# Patient Record
Sex: MALE | Race: WHITE | Employment: FULL TIME | URBAN - METROPOLITAN AREA
[De-identification: names, ages, dates, MRNs, and addresses within clinical notes are randomized per-mention and may not be internally consistent; named-entity substitution may affect disease eponyms.]

---

## 2017-03-30 ENCOUNTER — ANESTHESIA EVENT (OUTPATIENT)
Dept: SURGERY | Age: 57
End: 2017-03-30
Payer: COMMERCIAL

## 2017-03-31 ENCOUNTER — APPOINTMENT (OUTPATIENT)
Dept: CARDIAC CATH/INVASIVE PROCEDURES | Age: 57
End: 2017-03-31
Payer: COMMERCIAL

## 2017-03-31 ENCOUNTER — ANESTHESIA (OUTPATIENT)
Dept: SURGERY | Age: 57
End: 2017-03-31
Payer: COMMERCIAL

## 2017-03-31 ENCOUNTER — SURGERY (OUTPATIENT)
Age: 57
End: 2017-03-31

## 2017-03-31 PROCEDURE — 74011250636 HC RX REV CODE- 250/636

## 2017-03-31 PROCEDURE — 77030020143 HC AIRWY LARYN INTUB CGAS -A: Performed by: ANESTHESIOLOGY

## 2017-03-31 PROCEDURE — 74011000250 HC RX REV CODE- 250

## 2017-03-31 RX ORDER — DOBUTAMINE HYDROCHLORIDE 200 MG/100ML
INJECTION INTRAVENOUS
Status: DISCONTINUED | OUTPATIENT
Start: 2017-03-31 | End: 2017-03-31 | Stop reason: HOSPADM

## 2017-03-31 RX ORDER — LIDOCAINE HYDROCHLORIDE 20 MG/ML
INJECTION, SOLUTION EPIDURAL; INFILTRATION; INTRACAUDAL; PERINEURAL AS NEEDED
Status: DISCONTINUED | OUTPATIENT
Start: 2017-03-31 | End: 2017-03-31 | Stop reason: HOSPADM

## 2017-03-31 RX ORDER — PROPOFOL 10 MG/ML
INJECTION, EMULSION INTRAVENOUS
Status: DISCONTINUED | OUTPATIENT
Start: 2017-03-31 | End: 2017-03-31 | Stop reason: HOSPADM

## 2017-03-31 RX ORDER — FENTANYL CITRATE 50 UG/ML
INJECTION, SOLUTION INTRAMUSCULAR; INTRAVENOUS AS NEEDED
Status: DISCONTINUED | OUTPATIENT
Start: 2017-03-31 | End: 2017-03-31 | Stop reason: HOSPADM

## 2017-03-31 RX ORDER — CEFAZOLIN SODIUM 1 G/3ML
INJECTION, POWDER, FOR SOLUTION INTRAMUSCULAR; INTRAVENOUS AS NEEDED
Status: DISCONTINUED | OUTPATIENT
Start: 2017-03-31 | End: 2017-03-31 | Stop reason: HOSPADM

## 2017-03-31 RX ORDER — PROPOFOL 10 MG/ML
INJECTION, EMULSION INTRAVENOUS AS NEEDED
Status: DISCONTINUED | OUTPATIENT
Start: 2017-03-31 | End: 2017-03-31 | Stop reason: HOSPADM

## 2017-03-31 RX ORDER — SODIUM CHLORIDE, SODIUM LACTATE, POTASSIUM CHLORIDE, CALCIUM CHLORIDE 600; 310; 30; 20 MG/100ML; MG/100ML; MG/100ML; MG/100ML
INJECTION, SOLUTION INTRAVENOUS
Status: DISCONTINUED | OUTPATIENT
Start: 2017-03-31 | End: 2017-03-31 | Stop reason: HOSPADM

## 2017-03-31 RX ORDER — ONDANSETRON 2 MG/ML
INJECTION INTRAMUSCULAR; INTRAVENOUS AS NEEDED
Status: DISCONTINUED | OUTPATIENT
Start: 2017-03-31 | End: 2017-03-31 | Stop reason: HOSPADM

## 2017-03-31 RX ADMIN — FENTANYL CITRATE 50 MCG: 50 INJECTION, SOLUTION INTRAMUSCULAR; INTRAVENOUS at 09:56

## 2017-03-31 RX ADMIN — FENTANYL CITRATE 50 MCG: 50 INJECTION, SOLUTION INTRAMUSCULAR; INTRAVENOUS at 09:50

## 2017-03-31 RX ADMIN — PROPOFOL 60 MG: 10 INJECTION, EMULSION INTRAVENOUS at 08:23

## 2017-03-31 RX ADMIN — CEFAZOLIN SODIUM 2 G: 1 INJECTION, POWDER, FOR SOLUTION INTRAMUSCULAR; INTRAVENOUS at 08:29

## 2017-03-31 RX ADMIN — PROPOFOL 120 MCG/KG/MIN: 10 INJECTION, EMULSION INTRAVENOUS at 08:24

## 2017-03-31 RX ADMIN — PROPOFOL 100 MG: 10 INJECTION, EMULSION INTRAVENOUS at 09:57

## 2017-03-31 RX ADMIN — LIDOCAINE HYDROCHLORIDE 30 MG: 20 INJECTION, SOLUTION EPIDURAL; INFILTRATION; INTRACAUDAL; PERINEURAL at 08:23

## 2017-03-31 RX ADMIN — ONDANSETRON 4 MG: 2 INJECTION INTRAMUSCULAR; INTRAVENOUS at 10:44

## 2017-03-31 RX ADMIN — DOBUTAMINE HYDROCHLORIDE 5 MCG/KG/MIN: 200 INJECTION INTRAVENOUS at 10:33

## 2017-03-31 RX ADMIN — SODIUM CHLORIDE, SODIUM LACTATE, POTASSIUM CHLORIDE, CALCIUM CHLORIDE: 600; 310; 30; 20 INJECTION, SOLUTION INTRAVENOUS at 08:13

## 2017-03-31 NOTE — ANESTHESIA PREPROCEDURE EVALUATION
Anesthetic History   No history of anesthetic complications            Review of Systems / Medical History  Patient summary reviewed and pertinent labs reviewed    Pulmonary  Within defined limits                 Neuro/Psych   Within defined limits           Cardiovascular    Hypertension        Dysrhythmias (on Flecanide) : PVC      Exercise tolerance: >4 METS  Comments: Last echo 2015 showed normal LVEF with mild to moderate MR   GI/Hepatic/Renal  Within defined limits             Comments: Celiac Dz Endo/Other  Within defined limits           Other Findings              Physical Exam    Airway  Mallampati: II  TM Distance: > 6 cm  Neck ROM: normal range of motion   Mouth opening: Normal     Cardiovascular  Regular rate and rhythm,  S1 and S2 normal,  no murmur, click, rub, or gallop  Rhythm: regular  Rate: normal         Dental    Dentition: Upper partial plate     Pulmonary  Breath sounds clear to auscultation               Abdominal  GI exam deferred       Other Findings            Anesthetic Plan    ASA: 2  Anesthesia type: total IV anesthesia          Induction: Intravenous  Anesthetic plan and risks discussed with: Patient

## 2017-03-31 NOTE — ANESTHESIA POSTPROCEDURE EVALUATION
Post-Anesthesia Evaluation and Assessment    Patient: Patricio Bal MRN: [id number]  SSN: [id number]    YOB: 1960  Age: 55 y.o. Sex: male       Cardiovascular Function/Vital Signs  Visit Vitals    /90    Pulse 64    Temp 36.3 °C (97.3 °F)    Resp 12    Ht 5' 9\" (1.753 m)    Wt 77.1 kg (170 lb)    SpO2 97%    BMI 25.1 kg/m2       Patient is status post total IV anesthesia anesthesia for Procedure(s):  PVC  ABLATION. Nausea/Vomiting: None    Postoperative hydration reviewed and adequate. Pain:  Pain Scale 1: Numeric (0 - 10) (03/31/17 1216)  Pain Intensity 1: 4 (03/31/17 1216)   Managed    Neurological Status:   Neuro (WDL): Exceptions to WDL (03/31/17 1113)  Neuro  Neurologic State: Drowsy (03/31/17 1113)   At baseline    Mental Status and Level of Consciousness: Arousable    Pulmonary Status:   O2 Device: CO2 nasal cannula (03/31/17 1113)   Adequate oxygenation and airway patent    Complications related to anesthesia: None    Post-anesthesia assessment completed.  No concerns    Signed By: Osvaldo Peraza MD     March 31, 2017

## 2017-04-01 PROBLEM — I47.20 VENTRICULAR TACHYCARDIA: Status: ACTIVE | Noted: 2017-04-01

## 2022-03-19 PROBLEM — I47.20 VENTRICULAR TACHYCARDIA: Status: ACTIVE | Noted: 2017-04-01

## 2022-07-18 RX ORDER — ACEBUTOLOL HYDROCHLORIDE 200 MG/1
CAPSULE ORAL
Qty: 60 CAPSULE | Refills: 0 | OUTPATIENT
Start: 2022-07-18

## 2022-08-16 RX ORDER — ACEBUTOLOL HYDROCHLORIDE 200 MG/1
CAPSULE ORAL
Qty: 180 CAPSULE | Refills: 3 | Status: SHIPPED | OUTPATIENT
Start: 2022-08-16 | End: 2022-10-14 | Stop reason: SINTOL

## 2022-10-14 ENCOUNTER — OFFICE VISIT (OUTPATIENT)
Dept: CARDIOLOGY CLINIC | Age: 62
End: 2022-10-14
Payer: COMMERCIAL

## 2022-10-14 ENCOUNTER — TELEPHONE (OUTPATIENT)
Dept: CARDIOLOGY CLINIC | Age: 62
End: 2022-10-14

## 2022-10-14 VITALS
WEIGHT: 175.38 LBS | BODY MASS INDEX: 25.98 KG/M2 | HEART RATE: 71 BPM | HEIGHT: 69 IN | SYSTOLIC BLOOD PRESSURE: 142 MMHG | DIASTOLIC BLOOD PRESSURE: 90 MMHG

## 2022-10-14 DIAGNOSIS — I49.3 VENTRICULAR PREMATURE DEPOLARIZATION: Primary | ICD-10-CM

## 2022-10-14 DIAGNOSIS — I47.20 VENTRICULAR TACHYCARDIA: ICD-10-CM

## 2022-10-14 LAB
ANION GAP SERPL CALC-SCNC: 2 MMOL/L (ref 2–11)
BUN SERPL-MCNC: 13 MG/DL (ref 8–23)
CALCIUM SERPL-MCNC: 9.2 MG/DL (ref 8.3–10.4)
CHLORIDE SERPL-SCNC: 109 MMOL/L (ref 101–110)
CO2 SERPL-SCNC: 32 MMOL/L (ref 21–32)
CREAT SERPL-MCNC: 1 MG/DL (ref 0.8–1.5)
ERYTHROCYTE [DISTWIDTH] IN BLOOD BY AUTOMATED COUNT: 12.9 % (ref 11.9–14.6)
GLUCOSE SERPL-MCNC: 68 MG/DL (ref 65–100)
HCT VFR BLD AUTO: 54.5 % (ref 41.1–50.3)
HGB BLD-MCNC: 18 G/DL (ref 13.6–17.2)
MAGNESIUM SERPL-MCNC: 2.3 MG/DL (ref 1.8–2.4)
MCH RBC QN AUTO: 30.6 PG (ref 26.1–32.9)
MCHC RBC AUTO-ENTMCNC: 33 G/DL (ref 31.4–35)
MCV RBC AUTO: 92.7 FL (ref 82–102)
NRBC # BLD: 0 K/UL (ref 0–0.2)
PLATELET # BLD AUTO: 180 K/UL (ref 150–450)
PMV BLD AUTO: 12.1 FL (ref 9.4–12.3)
POTASSIUM SERPL-SCNC: 4.1 MMOL/L (ref 3.5–5.1)
RBC # BLD AUTO: 5.88 M/UL (ref 4.23–5.6)
SODIUM SERPL-SCNC: 143 MMOL/L (ref 133–143)
WBC # BLD AUTO: 4.9 K/UL (ref 4.3–11.1)

## 2022-10-14 PROCEDURE — G8484 FLU IMMUNIZE NO ADMIN: HCPCS | Performed by: INTERNAL MEDICINE

## 2022-10-14 PROCEDURE — G8419 CALC BMI OUT NRM PARAM NOF/U: HCPCS | Performed by: INTERNAL MEDICINE

## 2022-10-14 PROCEDURE — 3017F COLORECTAL CA SCREEN DOC REV: CPT | Performed by: INTERNAL MEDICINE

## 2022-10-14 PROCEDURE — 93000 ELECTROCARDIOGRAM COMPLETE: CPT | Performed by: INTERNAL MEDICINE

## 2022-10-14 PROCEDURE — 99214 OFFICE O/P EST MOD 30 MIN: CPT | Performed by: INTERNAL MEDICINE

## 2022-10-14 PROCEDURE — G8428 CUR MEDS NOT DOCUMENT: HCPCS | Performed by: INTERNAL MEDICINE

## 2022-10-14 PROCEDURE — 1036F TOBACCO NON-USER: CPT | Performed by: INTERNAL MEDICINE

## 2022-10-14 RX ORDER — UREA 10 %
1 LOTION (ML) TOPICAL NIGHTLY
COMMUNITY

## 2022-10-14 RX ORDER — VITAMIN B COMPLEX
1 TABLET ORAL DAILY
COMMUNITY

## 2022-10-14 RX ORDER — SILDENAFIL CITRATE 20 MG/1
40 TABLET ORAL DAILY PRN
COMMUNITY
Start: 2022-02-10

## 2022-10-14 NOTE — TELEPHONE ENCOUNTER
Left vm on message with pts full name. Told pt labs are normal except for elevated hgb which he's had in the past. Dr Sly Powers recommends he talk to PCP and have that worked up and discussed.

## 2022-10-14 NOTE — PROGRESS NOTES
[address] [place]  [address], [address], [address]  [address]  PHONE: [phone]  1960    SUBJECTIVE:   Geovanna Regan is a 62 y.o. male seen for a follow up visit regarding the following:     Chief Complaint   Patient presents with    Irregular Heart Beat    Hypertension       HPI:    Geovanna Regan is a very pleasant 62 y.o. male with a past medical and cardiac history significant for frequent PVCs now s/p ablation presenting for follow up. Pt feels better, does  not feel as many PVCs, no chest pain, SOB. He has had some chronic congestion, no associated respiratory symptoms. Cardiac PMH: (Old records have been reviewed and summarized below)    Reviewed office note [facility] 2/10/22    Past Medical History, Past Surgical History, Family history, Social History, and Medications were all reviewed with the patient today and updated as necessary. Current Outpatient Medications   Medication Sig Dispense Refill    sildenafil (REVATIO) 20 MG tablet Take 40 mg by mouth daily as needed      melatonin 1 MG tablet Take 1 tablet by mouth at bedtime      Coenzyme Q10 (COQ10) 100 MG CAPS Take 1 capsule by mouth daily      Multiple Vitamins-Minerals (MULTIVITAMIN ADULTS) TABS Take 1 tablet by mouth daily       No current facility-administered medications for this visit.      Allergies   Allergen Reactions    Amoxicillin Hives    Naproxen Sodium Rash    Adhesive Tape Rash    Sulfamethoxazole-Trimethoprim Hives     [unfilled]  Past Medical History:   Diagnosis Date    Arrhythmia     Autoimmune disease ([facility] [address]     CELIAC    History of cardiac arrhythmia 1/13/2014    Hypertension     Ill-defined condition     Celiac disease - no gluten    Impotence of organic origin     Male infertility, unspecified     Osteoarthritis of left hip 1/13/2014    Other ill-defined conditions(799.89)     pt reports hx of arrythmia caused by caffeine and stress; no caffeine and no arrythmia currently    Staph infection 9-9-2000    pt reports staph in finger migrated to hip;treated    Thromboembolus ([address]     after knee injury secondary to bandage     Past Surgical History:   Procedure Laterality Date    HEENT      oral skin graft    ORTHOPEDIC SURGERY      right shoulder    ORTHOPEDIC SURGERY      left hip     Family History   Problem Relation Age of Onset    Arrhythmia Mother         skipped heart beats    Diabetes Father      Social History     Tobacco Use    Smoking status: Never    Smokeless tobacco: Never   Substance Use Topics    Alcohol use: No       PHYSICAL EXAM:    BP (!) 142/90   Pulse 71   Ht 5' 9\" (1.753 m)   Wt 175 lb 6 oz (79.5 kg)   BMI 25.90 kg/m²      Wt Readings from Last 5 Encounters:   10/14/22 175 lb 6 oz (79.5 kg)   04/12/21 180 lb (81.6 kg)       BP Readings from Last 5 Encounters:   10/14/22 (!) 142/90   04/12/21 138/86       General appearance: Alert, well appearing, and in no distress   CV: RRR, no M/R/G, no JVD, normal distal pulses, no lower extremity edema  Pulm: Clear to auscultation, no wheezes, no crackles, no accessory muscle use  GI: Soft, nontender, nondistended  Neuro: Alert and oriented    Medical problems and test results were reviewed with the patient today. No results found for: K, PLK, WBK, KPOCT  No results found for: CREATININE, CREATININE, QADXA51W, CREATININE  No results found for: HGBPOC, HGB, HGBP  No results found for: INR, PTMR, PT1    No results found for: WBC, HGB, HCT, MCV, PLT   No results found for: NA, K, CL, CO2, BUN, CREATININE, GLUCOSE, CALCIUM      EKG: (EKG has been independently visualized by me with interpretation below)  Sinus  Rhythm  - frequent ectopic ventricular beat s   # VECs = 2  -Right atrial enlargement.     -Nonspecific ST depression  -Nondiagnostic. Lidia Marie was seen today for irregular heart beat and hypertension.     Diagnoses and all orders for this visit:    Arrhythmia  -     EKG 12 lead    Ventricular tachycardia  -     EKG 12 lead  -     Basic Metabolic Panel; Future  -     CBC; Future  -     Magnesium; Future      ASSESSMENT and PLAN   1. PVCs: Mr. Jerad Grider is a pleasant 54 yo WM with a history of symptomatic PVCs now s/p ablation with immediate suppression of PVCs, however, recurrence of some PVCs that night in the hospital. Pt was placed on sectral, but has weaned himself off and feels well off med, check BMP today and mag      2. HTN: borderline today, monitor closely    3. Elevated H/H: history of elevated H/H in the past, recheck CBC today    Patient has been instructed and agrees to call our office with any issues or other concerns related to their cardiac condition(s) and/or complaint(s). Return in about 1 year (around 10/14/2023). Gregoria Mallory MD, MS  Cardiology/Electrophysiology  10/14/2022  8:24 AM

## 2022-10-14 NOTE — TELEPHONE ENCOUNTER
----- Message from Ty Felipe MD sent at 10/14/2022  2:02 PM EDT -----  Labs are normal except for elevated hgb (he has had this in th past).  Recommend he get in with PCP to have this worked up

## 2022-10-17 ENCOUNTER — TELEPHONE (OUTPATIENT)
Dept: CARDIOLOGY CLINIC | Age: 62
End: 2022-10-17

## 2022-10-17 NOTE — TELEPHONE ENCOUNTER
----- Message from Kwabena Otero MD sent at 10/14/2022  2:02 PM EDT -----  Labs are normal except for elevated hgb (he has had this in th past).  Recommend he get in with PCP to have this worked up

## 2023-02-03 ENCOUNTER — TELEPHONE (OUTPATIENT)
Dept: CARDIOLOGY CLINIC | Age: 63
End: 2023-02-03

## 2023-02-03 NOTE — TELEPHONE ENCOUNTER
Mao Phelan with [employer] called to check if we received the cardiac questionnaire they sent out.  Case #: [id number]

## 2023-02-08 NOTE — TELEPHONE ENCOUNTER
Spoke with Harvey Keita @ [phone] who states that pt has applied for life insurance and cardiac information is needed. Informed him that paperwork has not been received but that it can only be completed if accompanied by written permission from pt. Understanding voiced.

## 2023-06-09 ENCOUNTER — CLINICAL DOCUMENTATION (OUTPATIENT)
Dept: ORTHOPEDIC SURGERY | Age: 63
End: 2023-06-09

## 2023-06-09 NOTE — PROGRESS NOTES
RECEIVED EMAIL FROM SARAH TO [facility] 6/16/23 APPT THAT SHE GOT PATIENT [facility]. CANCELLED

## 2024-05-02 ENCOUNTER — TELEPHONE (OUTPATIENT)
Age: 64
End: 2024-05-02

## 2024-05-02 NOTE — TELEPHONE ENCOUNTER
Patient called regarding recent \"vtach episode.\" Pt would like to possibly restart flecainide or acebutolol. I instructed the patient that he would need to be seen in office. Appointment made per Todd Bryant NP. Instructed patient to go to the emergency room if he experiences a similar episode. Patient verbalized understanding.

## 2024-05-08 ENCOUNTER — OFFICE VISIT (OUTPATIENT)
Age: 64
End: 2024-05-08
Payer: COMMERCIAL

## 2024-05-08 VITALS
SYSTOLIC BLOOD PRESSURE: 160 MMHG | HEART RATE: 75 BPM | BODY MASS INDEX: 26.22 KG/M2 | DIASTOLIC BLOOD PRESSURE: 98 MMHG | HEIGHT: 69 IN | WEIGHT: 177 LBS

## 2024-05-08 DIAGNOSIS — I10 HYPERTENSION, UNSPECIFIED TYPE: ICD-10-CM

## 2024-05-08 DIAGNOSIS — I49.3 PVC'S (PREMATURE VENTRICULAR CONTRACTIONS): Primary | ICD-10-CM

## 2024-05-08 PROCEDURE — 99213 OFFICE O/P EST LOW 20 MIN: CPT | Performed by: NURSE PRACTITIONER

## 2024-05-08 PROCEDURE — 3077F SYST BP >= 140 MM HG: CPT | Performed by: NURSE PRACTITIONER

## 2024-05-08 PROCEDURE — 93000 ELECTROCARDIOGRAM COMPLETE: CPT | Performed by: NURSE PRACTITIONER

## 2024-05-08 PROCEDURE — 3080F DIAST BP >= 90 MM HG: CPT | Performed by: NURSE PRACTITIONER

## 2024-05-08 RX ORDER — FLECAINIDE ACETATE 50 MG/1
50 TABLET ORAL PRN
Qty: 60 TABLET | Refills: 3 | Status: SHIPPED | OUTPATIENT
Start: 2024-05-08

## 2024-05-08 RX ORDER — FLECAINIDE ACETATE 50 MG/1
50 TABLET ORAL PRN
Qty: 60 TABLET | Refills: 3 | Status: SHIPPED | OUTPATIENT
Start: 2024-05-08 | End: 2024-05-08 | Stop reason: SDUPTHER

## 2024-05-08 NOTE — PROGRESS NOTES
infertility, unspecified     Osteoarthritis of left hip 1/13/2014    Other ill-defined conditions(799.89)     pt reports hx of arrythmia caused by caffeine and stress; no caffeine and no arrythmia currently    Staph infection 9-9-2000    pt reports staph in finger migrated to hip;treated    Thromboembolus (HCC)     after knee injury secondary to bandage     Past Surgical History:   Procedure Laterality Date    HEENT      oral skin graft    ORTHOPEDIC SURGERY      right shoulder    ORTHOPEDIC SURGERY      left hip     Family History   Problem Relation Age of Onset    Arrhythmia Mother         skipped heart beats    Diabetes Father      Social History     Tobacco Use    Smoking status: Never    Smokeless tobacco: Never   Substance Use Topics    Alcohol use: No       ROS:  A comprehensive review of systems was performed with the pertinent positives and negatives as noted in the HPI. All other systems were reviewed and are negative    PHYSICAL EXAM:    BP (!) 160/98   Pulse 75   Ht 1.753 m (5' 9\")   Wt 80.3 kg (177 lb)   BMI 26.14 kg/m²      Wt Readings from Last 5 Encounters:   05/08/24 80.3 kg (177 lb)   10/14/22 79.5 kg (175 lb 6 oz)   04/12/21 81.6 kg (180 lb)       BP Readings from Last 5 Encounters:   05/08/24 (!) 160/98   10/14/22 (!) 142/90   04/12/21 138/86       General appearance: Alert, well appearing, and in no distress   Eyes: Sclerae anicteric, Pupils equal, EOMI  ENT: Hearing grossly normal bilaterally, normal dentition  CV: RRR, no M/R/G, no JVD, normal distal pulses, no lower extremity edema  Pulm: Clear to auscultation, no wheezes, no crackles, no accessory muscle use  GI: Soft, nontender, nondistended, normal bowel sounds  Neuro: Alert and oriented  Skin: Normal coloration and turgor.  Psych: Appropriate affect, providing full and comprehensive history  MSK: normal muscle bulk and tone    Medical problems and test results were reviewed with the patient today.     @[email or web address]@    [facility]   No results

## 2024-06-20 ENCOUNTER — OFFICE VISIT (OUTPATIENT)
Age: 64
End: 2024-06-20
Payer: COMMERCIAL

## 2024-06-20 VITALS
SYSTOLIC BLOOD PRESSURE: 138 MMHG | BODY MASS INDEX: 26.5 KG/M2 | HEIGHT: 69 IN | HEART RATE: 62 BPM | WEIGHT: 178.9 LBS | DIASTOLIC BLOOD PRESSURE: 88 MMHG

## 2024-06-20 DIAGNOSIS — I49.3 PREMATURE VENTRICULAR CONTRACTION: Primary | ICD-10-CM

## 2024-06-20 PROCEDURE — 3075F SYST BP GE 130 - 139MM HG: CPT | Performed by: NURSE PRACTITIONER

## 2024-06-20 PROCEDURE — 99214 OFFICE O/P EST MOD 30 MIN: CPT | Performed by: NURSE PRACTITIONER

## 2024-06-20 PROCEDURE — 93000 ELECTROCARDIOGRAM COMPLETE: CPT | Performed by: NURSE PRACTITIONER

## 2024-06-20 PROCEDURE — 3079F DIAST BP 80-89 MM HG: CPT | Performed by: NURSE PRACTITIONER

## 2024-06-20 NOTE — PROGRESS NOTES
[facility] CARDIOLOGY, [address], [address]  [place] [address]  PHONE: [phone]  1960    SUBJECTIVE:   Chris Mckeon is a 63 y.o. male seen for a follow up visit regarding the following:     Chief Complaint   Patient presents with    Irregular Heart Beat    Hypertension    PVC's       HPI:    Chris Mckeon is a very pleasant 63 y.o. male with a past medical and cardiac history significant for PVCs s/p ablation and HTN.  He reported an episode of palpitations after taking a CBD gummy.  He took a flecainide and the palpitations stopped before EMS arrived.  A 14 day holter and echo were ordered at the last appt.  Monitor showed one episode of afib last 1 hr 5 min. Echo showed normal EF.  EKG shows SR.   Pt reports increased palpitations while on vacation when he ran out of apple cider vinegar but upon restarting he hasn't had any further issues.        Cardiac PMH: (Old records have been reviewed and summarized below)  Echo 5/2023: EF 58%   Monitor 5/2023: 1% burden of afib with RVR (av  in AF), longest 1 hour, no other significant arrhythmias       Reviewed office note Dr. Aguilar 7/28/2023    EKG: (EKG has been independently visualized by me with interpretation below)  Sinus Rhythm   Low voltage in precordial leads.    Past Medical History, Past Surgical History, Family history, Social History, and Medications were all reviewed with the patient today and updated as necessary.     Current Outpatient Medications   Medication Sig Dispense Refill    flecainide (TAMBOCOR) 50 MG tablet Take 1 tablet by mouth as needed (palpitations) May take up to 2 tablets in 24 hours 60 tablet 3    sildenafil (REVATIO) 20 MG tablet Take 2 tablets by mouth daily as needed      melatonin 1 MG tablet Take 1 tablet by mouth at bedtime      Coenzyme Q10 (COQ10) 100 MG CAPS Take 1 capsule by mouth daily      Multiple Vitamins-Minerals (MULTIVITAMIN ADULTS) TABS Take 1 tablet by mouth daily       No current

## 2024-10-02 ENCOUNTER — OFFICE VISIT (OUTPATIENT)
Age: 64
End: 2024-10-02
Payer: COMMERCIAL

## 2024-10-02 VITALS
BODY MASS INDEX: 25.92 KG/M2 | SYSTOLIC BLOOD PRESSURE: 148 MMHG | WEIGHT: 175 LBS | DIASTOLIC BLOOD PRESSURE: 88 MMHG | HEART RATE: 65 BPM | HEIGHT: 69 IN

## 2024-10-02 DIAGNOSIS — I49.3 PVC'S (PREMATURE VENTRICULAR CONTRACTIONS): Primary | ICD-10-CM

## 2024-10-02 DIAGNOSIS — I10 PRIMARY HYPERTENSION: ICD-10-CM

## 2024-10-02 PROCEDURE — 3077F SYST BP >= 140 MM HG: CPT | Performed by: INTERNAL MEDICINE

## 2024-10-02 PROCEDURE — 93000 ELECTROCARDIOGRAM COMPLETE: CPT | Performed by: INTERNAL MEDICINE

## 2024-10-02 PROCEDURE — 3079F DIAST BP 80-89 MM HG: CPT | Performed by: INTERNAL MEDICINE

## 2024-10-02 PROCEDURE — 99214 OFFICE O/P EST MOD 30 MIN: CPT | Performed by: INTERNAL MEDICINE

## 2024-10-02 NOTE — PROGRESS NOTES
[facility]  [address] [place] [address], [address]  [place] [address]  PHONE: [phone]  1960    SUBJECTIVE:   Chris Mckeon is a 63 y.o. male seen for a follow up visit regarding the following:     Chief Complaint   Patient presents with    PVCs       HPI:    Chris Mckeon is a very pleasant 63 y.o. male with a past medical and cardiac history significant for frequent PVCs now s/p ablation presenting for follow up. Pt was diagnosed with pAF since last OV. He has had a few episodes all lasting less than 1 hour, some palpitations, rapid HR, SOB. Otherwise doing well.     Cardiac PMH: (Old records have been reviewed and summarized below)  Monitor (5/8/24): < 1% burden of afib with RVR (av  in AF), longest 1 hour, no other significant arrhythmias     Reviewed office note Annette LEWIS 6/20/24    Past Medical History, Past Surgical History, Family history, Social History, and Medications were all reviewed with the patient today and updated as necessary.     Current Outpatient Medications   Medication Sig Dispense Refill    omeprazole (PRILOSEC) 20 MG delayed release capsule Take 1 capsule by mouth daily      flecainide (TAMBOCOR) 50 MG tablet Take 1 tablet by mouth as needed (palpitations) May take up to 2 tablets in 24 hours 60 tablet 3    sildenafil (REVATIO) 20 MG tablet Take 2 tablets by mouth daily as needed      melatonin 1 MG tablet Take 1 tablet by mouth at bedtime      Coenzyme Q10 (COQ10) 100 MG CAPS Take 1 capsule by mouth daily      Multiple Vitamins-Minerals (MULTIVITAMIN ADULTS) TABS Take 1 tablet by mouth daily       No current facility-administered medications for this visit.     Allergies   Allergen Reactions    Amoxicillin Hives    Naproxen Sodium Rash    Adhesive Tape Rash    Sulfamethoxazole-Trimethoprim Hives     [unfilled]  Past Medical History:   Diagnosis Date    Arrhythmia     Autoimmune disease (HCC)     CELIAC    History of cardiac arrhythmia 1/13/2014

## 2025-04-02 ENCOUNTER — OFFICE VISIT (OUTPATIENT)
Age: 65
End: 2025-04-02
Payer: COMMERCIAL

## 2025-04-02 VITALS
HEART RATE: 66 BPM | DIASTOLIC BLOOD PRESSURE: 82 MMHG | WEIGHT: 176.4 LBS | BODY MASS INDEX: 26.05 KG/M2 | SYSTOLIC BLOOD PRESSURE: 136 MMHG

## 2025-04-02 DIAGNOSIS — I10 PRIMARY HYPERTENSION: ICD-10-CM

## 2025-04-02 DIAGNOSIS — I49.3 PVC'S (PREMATURE VENTRICULAR CONTRACTIONS): Primary | ICD-10-CM

## 2025-04-02 PROCEDURE — 3075F SYST BP GE 130 - 139MM HG: CPT | Performed by: INTERNAL MEDICINE

## 2025-04-02 PROCEDURE — 93000 ELECTROCARDIOGRAM COMPLETE: CPT | Performed by: INTERNAL MEDICINE

## 2025-04-02 PROCEDURE — 3079F DIAST BP 80-89 MM HG: CPT | Performed by: INTERNAL MEDICINE

## 2025-04-02 PROCEDURE — 99213 OFFICE O/P EST LOW 20 MIN: CPT | Performed by: INTERNAL MEDICINE

## 2025-04-02 NOTE — PROGRESS NOTES
[facility]  [address] [place] [address], [address]  [place] [address]  PHONE: [phone]  1960    SUBJECTIVE:   Chris Mckeon is a 64 y.o. male seen for a follow up visit regarding the following:     Chief Complaint   Patient presents with    PVC's (premature ventricular contractions    6 Month Follow-Up       HPI:    Chris Mckeon is a very pleasant 64 y.o. male with a past medical and cardiac history significant for frequent PVCs now s/p ablation presenting for follow up. Pt was diagnosed with pAF, he has had a few episodes all lasting less than 1 hour, some palpitations, rapid HR, SOB. Otherwise doing well.     Cardiac PMH: (Old records have been reviewed and summarized below)  Monitor (5/8/24): < 1% burden of afib with RVR (av  in AF), longest 1 hour, no other significant arrhythmias     Reviewed office note Annette LEWIS 6/20/24    Past Medical History, Past Surgical History, Family history, Social History, and Medications were all reviewed with the patient today and updated as necessary.     Current Outpatient Medications   Medication Sig Dispense Refill    Dextromethorphan-guaiFENesin (MUCINEX DM PO) Take by mouth      flecainide (TAMBOCOR) 50 MG tablet Take 1 tablet by mouth as needed (palpitations) May take up to 2 tablets in 24 hours 60 tablet 3    sildenafil (REVATIO) 20 MG tablet Take 2 tablets by mouth daily as needed      melatonin 1 MG tablet Take 1 tablet by mouth at bedtime      Coenzyme Q10 (COQ10) 100 MG CAPS Take 1 capsule by mouth daily      Multiple Vitamins-Minerals (MULTIVITAMIN ADULTS) TABS Take 1 tablet by mouth daily       No current facility-administered medications for this visit.     Allergies   Allergen Reactions    Amoxicillin Hives    Naproxen Sodium Rash    Adhesive Tape Rash    Sulfamethoxazole-Trimethoprim Hives     [unfilled]  Past Medical History:   Diagnosis Date    Arrhythmia     Autoimmune disease     CELIAC    History of cardiac arrhythmia 1/13/2014